# Patient Record
(demographics unavailable — no encounter records)

---

## 2025-05-01 NOTE — HISTORY OF PRESENT ILLNESS
[FreeTextEntry1] : 68M with hx of colon polyps (advanced adenoma), HP s/p eradication here for evaluation for CRC screening No abdominal pain, nausea/vomiting, melena, hematochezia, dysphagia Weight stable; good appetite; BM QD Notes infrequent heartburn Last colonoscopy 5 years ago with polyps FHx: lung cancer (father); no other cancers Meds: finasteride, rapaflo

## 2025-05-01 NOTE — END OF VISIT
[FreeTextEntry3] : Attending addendum History of extensive large polypectomies noted Last examination by Dr. Morales several years ago Indications risks benefits and alternatives to surveillance colonoscopy reviewed.  Patient agreeable. Additionally, new onset of reflux, cannot rule out esophagitis, cannot rule out Leon's Patient agreeable to upper endoscopy as well.

## 2025-05-01 NOTE — ASSESSMENT
[FreeTextEntry1] : 68M with hx of colon polyps (advanced adenoma), HP s/p eradication here for evaluation for CRC screening  1. CRC screening with personal history of advanced adenoma. Last colonoscopy 2021 with x2 subcm TA removed.  2. GERD with intermittent heartburn.  Plan - schedule EGD/Colonoscopy; R/B/A discussed with patient. Patient amenable to proceeding with procedures

## 2025-05-01 NOTE — REVIEW OF SYSTEMS
[Heartburn] : heartburn [Fever] : no fever [Chills] : no chills [Shortness Of Breath] : no shortness of breath [Abdominal Pain] : no abdominal pain [Vomiting] : no vomiting [Constipation] : no constipation [Diarrhea] : no diarrhea [Melena (black stool)] : no melena